# Patient Record
Sex: FEMALE | Race: WHITE | Employment: UNEMPLOYED | URBAN - METROPOLITAN AREA
[De-identification: names, ages, dates, MRNs, and addresses within clinical notes are randomized per-mention and may not be internally consistent; named-entity substitution may affect disease eponyms.]

---

## 2024-06-18 ENCOUNTER — TELEPHONE (OUTPATIENT)
Dept: INTERNAL MEDICINE CLINIC | Facility: CLINIC | Age: 49
End: 2024-06-18

## 2024-06-18 ENCOUNTER — APPOINTMENT (OUTPATIENT)
Dept: MRI IMAGING | Facility: HOSPITAL | Age: 49
End: 2024-06-18
Attending: EMERGENCY MEDICINE

## 2024-06-18 ENCOUNTER — APPOINTMENT (OUTPATIENT)
Dept: CT IMAGING | Facility: HOSPITAL | Age: 49
End: 2024-06-18
Attending: EMERGENCY MEDICINE

## 2024-06-18 ENCOUNTER — HOSPITAL ENCOUNTER (EMERGENCY)
Facility: HOSPITAL | Age: 49
Discharge: HOME | End: 2024-06-19
Attending: EMERGENCY MEDICINE

## 2024-06-18 VITALS
RESPIRATION RATE: 18 BRPM | HEART RATE: 74 BPM | OXYGEN SATURATION: 97 % | TEMPERATURE: 98 F | WEIGHT: 154.31 LBS | SYSTOLIC BLOOD PRESSURE: 129 MMHG | DIASTOLIC BLOOD PRESSURE: 76 MMHG

## 2024-06-18 DIAGNOSIS — R42 VERTIGO: Primary | ICD-10-CM

## 2024-06-18 LAB
ANION GAP SERPL CALC-SCNC: 8 MMOL/L (ref 0–18)
BASOPHILS # BLD AUTO: 0.05 X10(3) UL (ref 0–0.2)
BASOPHILS NFR BLD AUTO: 0.7 %
BNP SERPL-MCNC: 2 PG/ML
BUN BLD-MCNC: 5 MG/DL (ref 9–23)
BUN/CREAT SERPL: 7 (ref 10–20)
CALCIUM BLD-MCNC: 9 MG/DL (ref 8.7–10.4)
CHLORIDE SERPL-SCNC: 109 MMOL/L (ref 98–112)
CO2 SERPL-SCNC: 23 MMOL/L (ref 21–32)
CREAT BLD-MCNC: 0.71 MG/DL
DEPRECATED RDW RBC AUTO: 50.6 FL (ref 35.1–46.3)
EGFRCR SERPLBLD CKD-EPI 2021: 104 ML/MIN/1.73M2 (ref 60–?)
EOSINOPHIL # BLD AUTO: 0.15 X10(3) UL (ref 0–0.7)
EOSINOPHIL NFR BLD AUTO: 2 %
ERYTHROCYTE [DISTWIDTH] IN BLOOD BY AUTOMATED COUNT: 17.2 % (ref 11–15)
GLUCOSE BLD-MCNC: 115 MG/DL (ref 70–99)
HCT VFR BLD AUTO: 36.8 %
HGB BLD-MCNC: 11.6 G/DL
IMM GRANULOCYTES # BLD AUTO: 0.02 X10(3) UL (ref 0–1)
IMM GRANULOCYTES NFR BLD: 0.3 %
LYMPHOCYTES # BLD AUTO: 2.91 X10(3) UL (ref 1–4)
LYMPHOCYTES NFR BLD AUTO: 37.8 %
MCH RBC QN AUTO: 25.4 PG (ref 26–34)
MCHC RBC AUTO-ENTMCNC: 31.5 G/DL (ref 31–37)
MCV RBC AUTO: 80.5 FL
MONOCYTES # BLD AUTO: 0.66 X10(3) UL (ref 0.1–1)
MONOCYTES NFR BLD AUTO: 8.6 %
NEUTROPHILS # BLD AUTO: 3.9 X10 (3) UL (ref 1.5–7.7)
NEUTROPHILS # BLD AUTO: 3.9 X10(3) UL (ref 1.5–7.7)
NEUTROPHILS NFR BLD AUTO: 50.6 %
OSMOLALITY SERPL CALC.SUM OF ELEC: 288 MOSM/KG (ref 275–295)
PLATELET # BLD AUTO: 353 10(3)UL (ref 150–450)
POTASSIUM SERPL-SCNC: 3.8 MMOL/L (ref 3.5–5.1)
RBC # BLD AUTO: 4.57 X10(6)UL
SODIUM SERPL-SCNC: 140 MMOL/L (ref 136–145)
TROPONIN I SERPL HS-MCNC: <3 NG/L
WBC # BLD AUTO: 7.7 X10(3) UL (ref 4–11)

## 2024-06-18 PROCEDURE — 96374 THER/PROPH/DIAG INJ IV PUSH: CPT

## 2024-06-18 PROCEDURE — 93010 ELECTROCARDIOGRAM REPORT: CPT

## 2024-06-18 PROCEDURE — 85025 COMPLETE CBC W/AUTO DIFF WBC: CPT | Performed by: EMERGENCY MEDICINE

## 2024-06-18 PROCEDURE — 70498 CT ANGIOGRAPHY NECK: CPT | Performed by: EMERGENCY MEDICINE

## 2024-06-18 PROCEDURE — 99285 EMERGENCY DEPT VISIT HI MDM: CPT

## 2024-06-18 PROCEDURE — 83880 ASSAY OF NATRIURETIC PEPTIDE: CPT | Performed by: EMERGENCY MEDICINE

## 2024-06-18 PROCEDURE — 80048 BASIC METABOLIC PNL TOTAL CA: CPT | Performed by: EMERGENCY MEDICINE

## 2024-06-18 PROCEDURE — 70551 MRI BRAIN STEM W/O DYE: CPT | Performed by: EMERGENCY MEDICINE

## 2024-06-18 PROCEDURE — 93005 ELECTROCARDIOGRAM TRACING: CPT

## 2024-06-18 PROCEDURE — 99284 EMERGENCY DEPT VISIT MOD MDM: CPT

## 2024-06-18 PROCEDURE — 84484 ASSAY OF TROPONIN QUANT: CPT | Performed by: EMERGENCY MEDICINE

## 2024-06-18 PROCEDURE — 70496 CT ANGIOGRAPHY HEAD: CPT | Performed by: EMERGENCY MEDICINE

## 2024-06-18 RX ORDER — MECLIZINE HYDROCHLORIDE 25 MG/1
25 TABLET ORAL ONCE
Status: COMPLETED | OUTPATIENT
Start: 2024-06-18 | End: 2024-06-18

## 2024-06-18 RX ORDER — MECLIZINE HYDROCHLORIDE 25 MG/1
25 TABLET ORAL 3 TIMES DAILY PRN
Qty: 20 TABLET | Refills: 0 | Status: SHIPPED | OUTPATIENT
Start: 2024-06-18

## 2024-06-18 RX ORDER — ONDANSETRON 2 MG/ML
4 INJECTION INTRAMUSCULAR; INTRAVENOUS ONCE
Status: COMPLETED | OUTPATIENT
Start: 2024-06-18 | End: 2024-06-18

## 2024-06-18 RX ORDER — LORAZEPAM 1 MG/1
1 TABLET ORAL ONCE
Status: COMPLETED | OUTPATIENT
Start: 2024-06-18 | End: 2024-06-18

## 2024-06-18 RX ORDER — LORAZEPAM 1 MG/1
1 TABLET ORAL 2 TIMES DAILY PRN
Qty: 10 TABLET | Refills: 0 | Status: SHIPPED | OUTPATIENT
Start: 2024-06-18 | End: 2024-06-24

## 2024-06-18 NOTE — TELEPHONE ENCOUNTER
ISAC Carpio    Reason for Call/Symptoms: Generalized Fatigue and Weakness, dizziness, photophobia, shortness of breath yesterday, unable to stand.     Onset: Yesterday    Courtesy Assessment: Patient called in [place]o; She gave me permission/verbal consent to speak with Ivana/friend [also present on call]; patient states she has been experiencing some dizziness, generalized severe fatigue and weakness since onset. Denies any one-sided weakness. Her ears are congested bilaterally. Denies any fever. Denies shortness of breath, chest pain, and/or chest tightness at this time. She reports she had some shortness of breath yesterday. She is not able to stand and has to stay seated and reclined. She has a spinning sensation also present. She denies any blood in stool. Denies any visual changes. Some photophobia is present. She has some nausea present. Denies vomiting or diarrhea or headache. Paresthesias of feet bilaterally. Advised Emergency Department now --> agreeable and patient will be going to [facility] now. Her  or friend will be driving her. Patient/friend instructed any new or worsening symptoms [reviewed] seek immediate medical attention--> 911. She may schedule a follow-up visit with Dr. Carpio to establish care upon discharge. Patient/friend verbalized understanding. No further questions or concerns at this time.    Level of care recommendation: ER - Self Transport    Advice given to patient: Emergency Department now

## 2024-06-18 NOTE — ED PROVIDER NOTES
Patient Seen in: [facility] Emergency Department      History     Chief Complaint   Patient presents with    Dizziness     Stated Complaint: headache, leg pain    Subjective:   HPI    48-year-old female presents for evaluation of dizziness.  Patient reports headache and dizziness that began yesterday.  She also noted a whooshing tinnitus bilaterally.  She reports she felt numb in both feet, otherwise no focal weakness or numbness.  She did have nausea associated with the symptoms, denies vomiting.  She has felt some chest pressure, denies shortness of breath.  Describes her dizziness as more of a vertigo, no lightheadedness or near syncope.    Objective:   History reviewed. No pertinent past medical history.           History reviewed. No pertinent surgical history.             Social History     Socioeconomic History    Marital status: Single   Tobacco Use    Smoking status: Never    Smokeless tobacco: Never              Review of Systems    Positive for stated complaint: headache, leg pain  Other systems are as noted in HPI.  Constitutional and vital signs reviewed.      All other systems reviewed and negative except as noted above.    Physical Exam     ED Triage Vitals   BP 06/18/24 1731 134/84   Pulse 06/18/24 1731 95   Resp 06/18/24 1731 18   Temp 06/18/24 1731 98 °F (36.7 °C)   Temp src 06/18/24 1731 Temporal   SpO2 06/18/24 1731 98 %   O2 Device 06/18/24 1824 None (Room air)       Current Vitals:   Vital Signs  BP: 123/70  Pulse: 88  Resp: 18  Temp: 98 °F (36.7 °C)  Temp src: Temporal    Oxygen Therapy  SpO2: 100 %  O2 Device: None (Room air)            Physical Exam  Vitals and nursing note reviewed.   Constitutional:       General: She is not in acute distress.     Appearance: She is well-developed.   HENT:      Head: Normocephalic and atraumatic.   Eyes:      Extraocular Movements: Extraocular movements intact.      Conjunctiva/sclera: Conjunctivae normal.      Pupils: Pupils are equal, round, and  reactive to light.   Cardiovascular:      Rate and Rhythm: Normal rate and regular rhythm.      Heart sounds: Normal heart sounds. No murmur heard.  Pulmonary:      Effort: Pulmonary effort is normal. No respiratory distress.      Breath sounds: Normal breath sounds.   Abdominal:      General: Bowel sounds are normal. There is no distension.      Palpations: Abdomen is soft.      Tenderness: There is no abdominal tenderness. There is no guarding or rebound.   Musculoskeletal:         General: Normal range of motion.      Cervical back: Normal range of motion and neck supple.   Skin:     General: Skin is warm and dry.      Findings: No rash.   Neurological:      General: No focal deficit present.      Mental Status: She is alert and oriented to person, place, and time.      Cranial Nerves: No cranial nerve deficit.      Sensory: No sensory deficit.      Motor: No weakness.               ED Course     Labs Reviewed   BASIC METABOLIC PANEL (8) - Abnormal; Notable for the following components:       Result Value    Glucose 115 (*)     BUN 5 (*)     BUN/CREA Ratio 7.0 (*)     All other components within normal limits   CBC W/ DIFFERENTIAL - Abnormal; Notable for the following components:    HGB 11.6 (*)     MCH 25.4 (*)     RDW-SD 50.6 (*)     RDW 17.2 (*)     All other components within normal limits   TROPONIN I HIGH SENSITIVITY - Normal   BNP (B TYPE NATRIURETIC PEPTIDE) - Normal   CBC WITH DIFFERENTIAL WITH PLATELET    Narrative:     The following orders were created for panel order CBC With Differential With Platelet.  Procedure                               Abnormality         Status                     ---------                               -----------         ------                     CBC W/ DIFFERENTIAL[[id number]]          Abnormal            Final result                 Please view results for these tests on the individual orders.   RAINBOW DRAW BLUE     EKG    Rate, intervals and axes as noted on EKG  Report.  Rate: 88  Rhythm: Sinus Rhythm  Reading: Sinus rhythm, no STEMI or ectopy                 Imaging Results Available and Reviewed while in ED:   CTA BRAIN + CTA CAROTIDS (CPT=70496/96258)   Final Result   PROCEDURE: CTA BRAIN + CTA CAROTIDS (CPT=70496/86453)       COMPARISON: None.       INDICATIONS: Dizziness, tinnitus, bilateral feet numbness.       TECHNIQUE:   CT images of the neck and brain were obtained with non-ionic    intravenous contrast material. Multi-planar reformatted/3-D images were    created to optimize visualization of vascular anatomy. Automated exposure    control for dose reduction was    used. Dose information is transmitted to the ACR (American College of    Radiology) NRDR (National Radiology Data Registry) which includes the Dose    Index Registry.Evaluation of internal carotid stenosis is based on NASCET    Criteria.         FINDINGS:   Atherosclerotic calcification cavernous carotid and vertebral arteries.     There is a 2 cm segment mild luminal narrowing of right internal carotid    approximately 1.8 cm distal to the    origin.  There is a small focus of beading seen best on image 56 series 12    involving the upper cervical carotid    CAROTID ARTERIES:   RIGHT COMMON CAROTID: No hemodynamically significant stenosis or    dissection.     RIGHT INTERNAL CAROTID: No hemodynamically significant stenosis or    dissection.         LEFT COMMON CAROTID: No hemodynamically significant stenosis or    dissection.     LEFT INTERNAL CAROTID: No hemodynamically significant stenosis or    dissection.         VERTEBRAL ARTERIES:   RIGHT: No hemodynamically significant stenosis or dissection.     LEFT: No hemodynamically significant stenosis or dissection.         BASILAR ARTERY: No hemodynamically significant stenosis or dissection.             AORTIC ARCH (Limited): No aneurysm or dissection.     MEDIASTINUM/APICES (Limited): No mass or adenopathy.         OTHER: No neck mass or  lymphadenopathy .  Degenerative changes in spine.       INTRACRANIAL ARTERIES:   ANTERIOR CEREBRALS:  No significant stenosis.  No visible aneurysm or    vascular malformation.     MIDDLE CEREBRALS: No significant stenosis.  No visible aneurysm or    vascular malformation.    POSTERIOR CEREBRALS: No significant stenosis.  No visible aneurysm or    vascular malformation.        OTHER FINDINGS: No edema, hemorrhage, mass or acute infarct.                       =====   CONCLUSION:    1. No major vessel occlusion, hemodynamically significant stenosis,    dissection, AVM or aneurysm.   2. Mild luminal narrowing of right internal carotid beginning about 1 cm    distal to the origin and extending over a length of about 3-4 cm with a    focus of beading in in mid to upper cervical segment suggesting focal    fibromuscular dysplasia.   3. No findings to account for the dizziness and tinnitus.   4. No acute infarct or hemorrhage.                   Dictated by (CST): Mode Marrero MD on 6/18/2024 at 8:10 PM        Finalized by (CST): Mode Marrero MD on 6/18/2024 at 8:26 PM               MRI BRAIN WO ACUTE (3) SEQUENCE (CPT=70551)    (Results Pending)       ED Medications Administered:   Medications   meclizine (Antivert) tab 25 mg (25 mg Oral Given 6/18/24 1822)   ondansetron (Zofran) 4 MG/2ML injection 4 mg (4 mg Intravenous Given 6/18/24 1822)   iopamidol 76% (ISOVUE-370) injection for power injector (70 mL Intravenous Given 6/18/24 1952)   LORazepam (Ativan) tab 1 mg (1 mg Oral Given 6/18/24 2203)         Vitals:    06/18/24 1911 06/18/24 1935 06/18/24 2100 06/18/24 2200   BP: 133/76 127/70 (!) 113/98 123/70   Pulse: 84 89 88 88   Resp: 18 17 17 18   Temp:       TempSrc:       SpO2: 98% 100% 100% 100%   Weight:         *I personally reviewed and interpreted all ED vitals.             MDM        Medical Decision Making  Differential diagnosis includes but is not limited to Ménière's, BPPV, aneurysm, vertebral dissection  TIA, stroke, arrhythmia, electrolyte imbalance, ACS, anemia    Well-appearing patient with normal neurovascular exam, Labs are unrevealing.  No evidence of aneurysm on CTA, MRI pending.  If no acute findings on MRI anticipate discharge with meclizine and Ativan as needed with close outpatient follow-up and strict return precautions.  Signed out to oncoming ER physician.    Amount and/or Complexity of Data Reviewed  External Data Reviewed:      Details: EMR reviewed, no old labs available for comparison  Labs: ordered.  Radiology: ordered.        Disposition and Plan     Clinical Impression:  1. Vertigo         Disposition:  There is no disposition on file for this visit.  There is no disposition time on file for this visit.    Follow-up:  Booker Perez MD  [address]  [place] [id number] [phone]    Follow up            Medications Prescribed:  Current Discharge Medication List        START taking these medications    Details   meclizine 25 MG Oral Tab Take 1 tablet (25 mg total) by mouth 3 (three) times daily as needed for Dizziness.  Qty: 20 tablet, Refills: 0    Associated Diagnoses: Vertigo      LORAZEPAM 1 MG Oral Tab Take 1 tablet (1 mg total) by mouth 2 (two) times daily as needed (vertigo).  Qty: 10 tablet, Refills: 0    Associated Diagnoses: Vertigo

## 2024-06-19 LAB
ATRIAL RATE: 86 BPM
ATRIAL RATE: 88 BPM
P AXIS: 126 DEGREES
P AXIS: 149 DEGREES
P-R INTERVAL: 148 MS
P-R INTERVAL: 156 MS
Q-T INTERVAL: 380 MS
Q-T INTERVAL: 386 MS
QRS DURATION: 76 MS
QRS DURATION: 84 MS
QTC CALCULATION (BEZET): 454 MS
QTC CALCULATION (BEZET): 467 MS
R AXIS: 1 DEGREES
R AXIS: 243 DEGREES
T AXIS: 145 DEGREES
T AXIS: 154 DEGREES
VENTRICULAR RATE: 86 BPM
VENTRICULAR RATE: 88 BPM

## 2024-06-19 NOTE — DISCHARGE INSTRUCTIONS
Take meclizine as needed for dizziness.  You can also take Ativan if meclizine is not improving the dizziness.    See your primary care for follow-up within the next several days.    Return to the ER if you develop worsening symptoms or emergent concerns.

## 2024-06-19 NOTE — TELEPHONE ENCOUNTER
1st attempt - tried to contact patient but phone on file is just ringing , no answer. We will try to call back again next time.

## 2024-06-19 NOTE — TELEPHONE ENCOUNTER
Patient was discharged - I called her to assist with scheduling visit with Dr. Carpio to establish care as it was mentioned yesterday during call --> no answer. [1st attempt]    Patient  - to assist with offering to schedule to establish care with Dr. Carpio as PCP [Chinese speaking patient]

## 2024-06-20 NOTE — TELEPHONE ENCOUNTER
2nd attempt - tried to contact patient again but the phone# on file is still just ringing no answer.  Please send letter to patient , Thank you.

## 2024-06-24 RX ORDER — LORAZEPAM 1 MG/1
1 TABLET ORAL 2 TIMES DAILY PRN
Qty: 10 TABLET | Refills: 0 | Status: SHIPPED | OUTPATIENT
Start: 2024-06-24